# Patient Record
Sex: FEMALE | Race: WHITE
[De-identification: names, ages, dates, MRNs, and addresses within clinical notes are randomized per-mention and may not be internally consistent; named-entity substitution may affect disease eponyms.]

---

## 2019-09-26 ENCOUNTER — HOSPITAL ENCOUNTER (OUTPATIENT)
Dept: HOSPITAL 4 - SPU | Age: 29
Setting detail: OBSERVATION
Discharge: HOME | End: 2019-09-26
Attending: SPECIALIST | Admitting: SPECIALIST
Payer: COMMERCIAL

## 2019-09-26 VITALS — HEIGHT: 64 IN | BODY MASS INDEX: 32.27 KG/M2 | WEIGHT: 189 LBS

## 2019-09-26 VITALS — SYSTOLIC BLOOD PRESSURE: 113 MMHG

## 2019-09-26 DIAGNOSIS — Z3A.38: ICD-10-CM

## 2019-09-26 PROCEDURE — G0378 HOSPITAL OBSERVATION PER HR: HCPCS

## 2019-10-05 ENCOUNTER — HOSPITAL ENCOUNTER (INPATIENT)
Dept: HOSPITAL 4 - SPU | Age: 29
LOS: 1 days | Discharge: HOME | End: 2019-10-06
Attending: SPECIALIST | Admitting: SPECIALIST
Payer: COMMERCIAL

## 2019-10-05 VITALS — HEIGHT: 64 IN | WEIGHT: 190 LBS | BODY MASS INDEX: 32.44 KG/M2

## 2019-10-05 VITALS — SYSTOLIC BLOOD PRESSURE: 126 MMHG

## 2019-10-05 DIAGNOSIS — D66: ICD-10-CM

## 2019-10-05 DIAGNOSIS — Z3A.39: ICD-10-CM

## 2019-10-05 LAB
BASOPHILS # BLD AUTO: 0.1 K/UL (ref 0–0.2)
BASOPHILS NFR BLD AUTO: 1 % (ref 0–2)
EOSINOPHIL # BLD AUTO: 0 K/UL (ref 0–0.4)
EOSINOPHIL NFR BLD AUTO: 0.2 % (ref 0–4)
ERYTHROCYTE [DISTWIDTH] IN BLOOD BY AUTOMATED COUNT: 13.5 % (ref 9–15)
HCT VFR BLD AUTO: 41.2 % (ref 36–48)
HGB BLD-MCNC: 14.3 G/DL (ref 12–16)
LYMPHOCYTES # BLD AUTO: 1.5 K/UL (ref 1–5.5)
LYMPHOCYTES NFR BLD AUTO: 26.1 % (ref 20.5–51.5)
MCH RBC QN AUTO: 34 PG (ref 27–31)
MCHC RBC AUTO-ENTMCNC: 35 % (ref 32–36)
MCV RBC AUTO: 98 FL (ref 79–98)
MONOCYTES # BLD MANUAL: 0.3 K/UL (ref 0–1)
MONOCYTES # BLD MANUAL: 4.5 % (ref 1.7–9.3)
NEUTROPHILS # BLD AUTO: 4 K/UL (ref 1.8–7.7)
NEUTROPHILS NFR BLD AUTO: 68.2 % (ref 40–70)
PLATELET # BLD AUTO: 155 K/UL (ref 130–430)
RBC # BLD AUTO: 4.21 MIL/UL (ref 4.2–6.2)
WBC # BLD AUTO: 5.8 K/UL (ref 4.8–10.8)

## 2019-10-05 RX ADMIN — OXYCODONE HYDROCHLORIDE AND ACETAMINOPHEN PRN TAB: 5; 325 TABLET ORAL at 15:26

## 2019-10-06 RX ADMIN — OXYCODONE HYDROCHLORIDE AND ACETAMINOPHEN PRN TAB: 5; 325 TABLET ORAL at 02:00

## 2021-09-25 ENCOUNTER — HOSPITAL ENCOUNTER (OUTPATIENT)
Dept: HOSPITAL 4 - SPU | Age: 31
Setting detail: OBSERVATION
Discharge: HOME | End: 2021-09-25
Attending: SPECIALIST | Admitting: SPECIALIST
Payer: COMMERCIAL

## 2021-09-25 VITALS — BODY MASS INDEX: 31.49 KG/M2 | WEIGHT: 189 LBS | HEIGHT: 65 IN

## 2021-09-25 DIAGNOSIS — Z3A.39: ICD-10-CM

## 2021-09-25 PROCEDURE — G0378 HOSPITAL OBSERVATION PER HR: HCPCS

## 2021-09-25 PROCEDURE — 81002 URINALYSIS NONAUTO W/O SCOPE: CPT

## 2022-12-29 ENCOUNTER — HOSPITAL ENCOUNTER (EMERGENCY)
Dept: HOSPITAL 4 - SED | Age: 32
Discharge: HOME | End: 2022-12-29
Payer: COMMERCIAL

## 2022-12-29 VITALS — SYSTOLIC BLOOD PRESSURE: 117 MMHG

## 2022-12-29 DIAGNOSIS — Z3A.01: ICD-10-CM

## 2022-12-29 DIAGNOSIS — O26.891: Primary | ICD-10-CM

## 2022-12-29 DIAGNOSIS — Z79.899: ICD-10-CM

## 2022-12-29 LAB
APPEARANCE UR: CLEAR
BASOPHILS # BLD AUTO: 0 K/UL (ref 0–0.2)
BASOPHILS NFR BLD AUTO: 0.5 % (ref 0–2)
BILIRUB UR QL STRIP: NEGATIVE
COLOR UR: YELLOW
EOSINOPHIL # BLD AUTO: 0 K/UL (ref 0–0.4)
EOSINOPHIL NFR BLD AUTO: 0.6 % (ref 0–4)
ERYTHROCYTE [DISTWIDTH] IN BLOOD BY AUTOMATED COUNT: 13.3 % (ref 9–15)
GLUCOSE UR STRIP-MCNC: NEGATIVE MG/DL
HCT VFR BLD AUTO: 43 % (ref 36–48)
HGB BLD-MCNC: 14.4 G/DL (ref 12–16)
HGB UR QL STRIP: NEGATIVE
KETONES UR STRIP-MCNC: NEGATIVE MG/DL
LEUKOCYTE ESTERASE UR QL STRIP: NEGATIVE
LYMPHOCYTES # BLD AUTO: 1.5 K/UL (ref 1–5.5)
LYMPHOCYTES NFR BLD AUTO: 23.2 % (ref 20.5–51.5)
MCH RBC QN AUTO: 34 PG (ref 27–31)
MCHC RBC AUTO-ENTMCNC: 34 % (ref 32–36)
MCV RBC AUTO: 101 FL (ref 79–98)
MONOCYTES # BLD MANUAL: 0.5 K/UL (ref 0–1)
MONOCYTES # BLD MANUAL: 7.9 % (ref 1.7–9.3)
NEUTROPHILS # BLD AUTO: 4.5 K/UL (ref 1.8–7.7)
NEUTROPHILS NFR BLD AUTO: 67.8 % (ref 40–70)
NITRITE UR QL STRIP: NEGATIVE
PH UR STRIP: 6.5 [PH] (ref 5–8)
PLATELET # BLD AUTO: 214 K/UL (ref 130–430)
PROT UR QL STRIP: NEGATIVE
RBC # BLD AUTO: 4.28 MIL/UL (ref 4.2–6.2)
SP GR UR STRIP: 1.01 (ref 1–1.03)
UROBILINOGEN UR STRIP-MCNC: 0.2 MG/DL (ref 0.2–1)
WBC # BLD AUTO: 6.6 K/UL (ref 4.8–10.8)

## 2022-12-29 NOTE — NUR
PT CAME IN FROM HOME, STATES TWO WEEKS AGO SHE HAD 2 + PREG TESTS AT HOME. PT 
IS HAVING LEFT OVARIAN PAIN AND ACHING NONSTOP. CALLED DR. TOPETE WHO 
REFERRED HERE . PT IS AMBULATORY, AOX4, VSS

## 2023-08-28 ENCOUNTER — HOSPITAL ENCOUNTER (INPATIENT)
Dept: HOSPITAL 4 - SPU | Age: 33
LOS: 2 days | Discharge: HOME | End: 2023-08-30
Attending: SPECIALIST | Admitting: SPECIALIST
Payer: COMMERCIAL

## 2023-08-28 VITALS — HEIGHT: 64 IN | WEIGHT: 196 LBS | BODY MASS INDEX: 33.46 KG/M2

## 2023-08-28 DIAGNOSIS — Z3A.40: ICD-10-CM

## 2023-08-28 DIAGNOSIS — O48.0: Primary | ICD-10-CM

## 2023-08-28 DIAGNOSIS — O41.03X0: ICD-10-CM

## 2023-08-29 VITALS — RESPIRATION RATE: 18 BRPM | SYSTOLIC BLOOD PRESSURE: 130 MMHG | HEART RATE: 88 BPM | TEMPERATURE: 97.7 F

## 2023-08-29 LAB
BASOPHILS # BLD AUTO: 0 K/UL (ref 0–0.2)
BASOPHILS NFR BLD AUTO: 0.4 % (ref 0–2)
EOSINOPHIL # BLD AUTO: 0 K/UL (ref 0–0.4)
EOSINOPHIL NFR BLD AUTO: 0.1 % (ref 0–4)
ERYTHROCYTE [DISTWIDTH] IN BLOOD BY AUTOMATED COUNT: 14.1 % (ref 9–15)
HCT VFR BLD AUTO: 39.6 % (ref 36–48)
HGB BLD-MCNC: 13.2 G/DL (ref 12–16)
LYMPHOCYTES # BLD AUTO: 1.3 K/UL (ref 1–5.5)
LYMPHOCYTES NFR BLD AUTO: 18.6 % (ref 20.5–51.5)
MCH RBC QN AUTO: 32 PG (ref 27–31)
MCHC RBC AUTO-ENTMCNC: 33 % (ref 32–36)
MCV RBC AUTO: 96 FL (ref 79–98)
MONOCYTES # BLD MANUAL: 0.3 K/UL (ref 0–1)
MONOCYTES # BLD MANUAL: 4.8 % (ref 1.7–9.3)
NEUTROPHILS # BLD AUTO: 5.3 K/UL (ref 1.8–7.7)
NEUTROPHILS NFR BLD AUTO: 76.1 % (ref 40–70)
PLATELET # BLD AUTO: 166 K/UL (ref 130–430)
RBC # BLD AUTO: 4.12 MIL/UL (ref 4.2–6.2)
WBC # BLD AUTO: 6.9 K/UL (ref 4.8–10.8)

## 2023-08-29 RX ADMIN — MORPHINE SULFATE PRN MG: 10 INJECTION INTRAVENOUS at 18:03

## 2023-08-29 RX ADMIN — ONDANSETRON PRN MG: 2 INJECTION INTRAMUSCULAR; INTRAVENOUS at 16:24

## 2023-08-29 RX ADMIN — ONDANSETRON PRN MG: 2 INJECTION INTRAMUSCULAR; INTRAVENOUS at 21:09

## 2023-08-29 RX ADMIN — MORPHINE SULFATE PRN MG: 10 INJECTION INTRAVENOUS at 16:28

## 2023-08-30 LAB
BASOPHILS # BLD AUTO: 0 K/UL (ref 0–0.2)
BASOPHILS NFR BLD AUTO: 0.2 % (ref 0–2)
EOSINOPHIL # BLD AUTO: 0 K/UL (ref 0–0.4)
EOSINOPHIL NFR BLD AUTO: 0.2 % (ref 0–4)
ERYTHROCYTE [DISTWIDTH] IN BLOOD BY AUTOMATED COUNT: 13.8 % (ref 9–15)
HCT VFR BLD AUTO: 35.5 % (ref 36–48)
HGB BLD-MCNC: 12 G/DL (ref 12–16)
LYMPHOCYTES # BLD AUTO: 1.1 K/UL (ref 1–5.5)
LYMPHOCYTES NFR BLD AUTO: 14.3 % (ref 20.5–51.5)
MCH RBC QN AUTO: 32 PG (ref 27–31)
MCHC RBC AUTO-ENTMCNC: 34 % (ref 32–36)
MCV RBC AUTO: 95 FL (ref 79–98)
MONOCYTES # BLD MANUAL: 0.6 K/UL (ref 0–1)
MONOCYTES # BLD MANUAL: 7.3 % (ref 1.7–9.3)
NEUTROPHILS # BLD AUTO: 6 K/UL (ref 1.8–7.7)
NEUTROPHILS NFR BLD AUTO: 78 % (ref 40–70)
PLATELET # BLD AUTO: 125 K/UL (ref 130–430)
RBC # BLD AUTO: 3.72 MIL/UL (ref 4.2–6.2)
WBC # BLD AUTO: 7.7 K/UL (ref 4.8–10.8)

## 2023-08-30 RX ADMIN — IBUPROFEN SCH MG: 600 TABLET, FILM COATED ORAL at 12:00

## 2023-08-30 RX ADMIN — IBUPROFEN SCH MG: 600 TABLET, FILM COATED ORAL at 09:28

## 2023-08-30 RX ADMIN — Medication SCH MLS/HR: at 14:14

## 2023-08-30 RX ADMIN — Medication SCH MLS/HR: at 05:36
